# Patient Record
Sex: FEMALE | Race: BLACK OR AFRICAN AMERICAN | Employment: UNEMPLOYED | URBAN - METROPOLITAN AREA
[De-identification: names, ages, dates, MRNs, and addresses within clinical notes are randomized per-mention and may not be internally consistent; named-entity substitution may affect disease eponyms.]

---
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
## 2025-01-06 RX ORDER — ERGOCALCIFEROL 1.25 MG/1
50000 CAPSULE, LIQUID FILLED ORAL WEEKLY
Qty: 12 CAPSULE | Refills: 1 | Status: SHIPPED | OUTPATIENT
Start: 2025-01-06

## 2025-02-17 ENCOUNTER — OFFICE VISIT (OUTPATIENT)
Dept: FAMILY MEDICINE CLINIC | Age: 20
End: 2025-02-17

## 2025-02-17 VITALS
BODY MASS INDEX: 26.28 KG/M2 | WEIGHT: 142.8 LBS | OXYGEN SATURATION: 99 % | SYSTOLIC BLOOD PRESSURE: 110 MMHG | DIASTOLIC BLOOD PRESSURE: 78 MMHG | TEMPERATURE: 97 F | HEIGHT: 62 IN | HEART RATE: 67 BPM | RESPIRATION RATE: 16 BRPM

## 2025-02-17 DIAGNOSIS — J40 SINOBRONCHITIS: ICD-10-CM

## 2025-02-17 DIAGNOSIS — J32.9 SINOBRONCHITIS: ICD-10-CM

## 2025-02-17 DIAGNOSIS — Z00.00 ENCOUNTER FOR WELL ADULT EXAM WITHOUT ABNORMAL FINDINGS: Primary | ICD-10-CM

## 2025-02-17 DIAGNOSIS — G43.E09 CHRONIC MIGRAINE WITH AURA WITHOUT STATUS MIGRAINOSUS, NOT INTRACTABLE: ICD-10-CM

## 2025-02-17 RX ORDER — SUMATRIPTAN SUCCINATE 25 MG/1
TABLET ORAL
Qty: 9 TABLET | Refills: 5 | Status: SHIPPED | OUTPATIENT
Start: 2025-02-17

## 2025-02-17 RX ORDER — LORATADINE 10 MG/1
10 TABLET ORAL DAILY
Qty: 30 TABLET | Refills: 5 | Status: SHIPPED | OUTPATIENT
Start: 2025-02-17

## 2025-02-17 RX ORDER — NAPROXEN 500 MG/1
500 TABLET ORAL 2 TIMES DAILY WITH MEALS
Qty: 60 TABLET | Refills: 5 | Status: SHIPPED | OUTPATIENT
Start: 2025-02-17

## 2025-02-17 SDOH — ECONOMIC STABILITY: FOOD INSECURITY: WITHIN THE PAST 12 MONTHS, THE FOOD YOU BOUGHT JUST DIDN'T LAST AND YOU DIDN'T HAVE MONEY TO GET MORE.: NEVER TRUE

## 2025-02-17 SDOH — ECONOMIC STABILITY: FOOD INSECURITY: WITHIN THE PAST 12 MONTHS, YOU WORRIED THAT YOUR FOOD WOULD RUN OUT BEFORE YOU GOT MONEY TO BUY MORE.: NEVER TRUE

## 2025-02-17 ASSESSMENT — PATIENT HEALTH QUESTIONNAIRE - PHQ9
1. LITTLE INTEREST OR PLEASURE IN DOING THINGS: NOT AT ALL
2. FEELING DOWN, DEPRESSED OR HOPELESS: NOT AT ALL
SUM OF ALL RESPONSES TO PHQ QUESTIONS 1-9: 0
SUM OF ALL RESPONSES TO PHQ9 QUESTIONS 1 & 2: 0
SUM OF ALL RESPONSES TO PHQ QUESTIONS 1-9: 0

## 2025-02-17 NOTE — PROGRESS NOTES
]  Well Adult Note  Name: Estrella Cadet Today’s Date: 2025   MRN: [id number] Sex: Female   Age: 20 y.o. Ethnicity: Unavailable / Unknown   : 2005 Race: Black /       Estrella Cadet is here for a well adult exam.          Assessment & Plan  1. Sinusitis.  She has been experiencing symptoms for 3 weeks, including runny nose, stuffiness, and congestion. An antibiotic will be prescribed to address the sinus infection. She is advised to continue using Claritin and naproxen for symptom relief.    2. Migraine headaches.  She reports frequent migraines occurring at least four times a week, sometimes accompanied by aura. The migraines are not related to her menstrual cycle. Imitrex will be prescribed to be taken at the onset of a migraine. If needed, a second dose can be taken after 2 hours, but no more than two doses in a 24-hour period. Information about migraines and potential preventive measures, including melatonin, CoQ10, riboflavin, and over-the-counter magnesium, will be provided. She is advised to take these supplements consistently for about a month to evaluate their effectiveness. If Imitrex is effective in aborting the migraines but they persist at a frequency of four times per week, a discussion regarding preventive therapy will be initiated.    Encounter for well adult exam without abnormal findings  Chronic migraine with aura without status migrainosus, not intractable  -     SUMAtriptan (IMITREX) 25 MG tablet; 1 tab po at onset HA. May repeat in 2 hours if HA persists. Max 2 pills/24 hours, Disp-9 tablet, R-5Normal  Results         Return in 1 year (on 2026) for CPE (Physical Exam).     Subjective   History of Present Illness  The patient presents for a wellness exam.    She has been experiencing symptoms of illness, including rhinorrhea, nasal congestion, and sinus pressure, persisting for the past 3 weeks. She reports no changes in her medical or surgical history. She

## 2025-02-17 NOTE — PATIENT INSTRUCTIONS
Magnesium Oxide (400-1000 mg daily):  has a relaxant effect on smooth muscles and blood vessels. We often give intravenous magnesium to patients who come into the emergency department for migraine because it helps to break the migraine. Three trials found 40-90% average headache reduction when used as a preventative. Magnesium also demonstrated the benefit in menstrually related migraine. Magnesium is part of the messenger system in the serotonin cascade and it is a good muscle relaxant. It is also useful for constipation which can be a side effect of other medications used to treat migraine. Good sources include nuts, whole grains, and tomatoes.     Coenzyme Q10 (200 mg daily or 150 mg twice daily): twice a day have been shown to be effective. (: This is present in almost all cells in the body and is critical component for the conversion of energy. Recent studies have shown that a nutritional supplement of CoQ10 can reduce the frequency of migraine attacks by improving the energy production of cells as with riboflavin.     Riboflavin (Vitamin B2): 200 mg twice a day (or 400 mg daily). This vitamin assists nerve cells in the production of ATP, a principal energy storing molecule. It is necessary for many chemical reactions in the body. There have been at least 3 clinical trials of riboflavin using 400 mg per day all of which suggested that migraine frequency can be decreased. All 3 trials showed significant improvement in over half of migraine sufferers. The supplement is found in bread, cereal, milk, meat, and poultry. Most Americans get more riboflavin than the recommended daily allowance, however riboflavin deficiency is not necessary for the supplements to help prevent headache.     Melatonin: Increasing evidence shows correlation between melatonin secretion and headache conditions. Melatonin supplementation has shown decreased headache intensity and duration. It is widely used as a sleep aid. Sleep is nature's

## 2025-02-19 RX ORDER — AMOXICILLIN 500 MG/1
500 CAPSULE ORAL 2 TIMES DAILY
Qty: 20 CAPSULE | Refills: 0 | Status: SHIPPED | OUTPATIENT
Start: 2025-02-19 | End: 2025-03-01

## 2025-05-05 ENCOUNTER — APPOINTMENT (OUTPATIENT)
Dept: GENERAL RADIOLOGY | Age: 20
End: 2025-05-05
Payer: MEDICAID

## 2025-05-05 ENCOUNTER — APPOINTMENT (OUTPATIENT)
Dept: ULTRASOUND IMAGING | Age: 20
End: 2025-05-05
Payer: MEDICAID

## 2025-05-05 ENCOUNTER — HOSPITAL ENCOUNTER (EMERGENCY)
Age: 20
Discharge: HOME | End: 2025-05-05
Payer: MEDICAID

## 2025-05-05 VITALS
SYSTOLIC BLOOD PRESSURE: 123 MMHG | WEIGHT: 135 LBS | HEIGHT: 62 IN | RESPIRATION RATE: 14 BRPM | OXYGEN SATURATION: 100 % | DIASTOLIC BLOOD PRESSURE: 86 MMHG | BODY MASS INDEX: 24.84 KG/M2 | TEMPERATURE: 98.1 F | HEART RATE: 92 BPM

## 2025-05-05 DIAGNOSIS — R10.30 LOWER ABDOMINAL PAIN: Primary | ICD-10-CM

## 2025-05-05 LAB
ALBUMIN SERPL-MCNC: 4.6 G/DL (ref 3.5–5.2)
ALP SERPL-CCNC: 95 U/L (ref 35–104)
ALT SERPL-CCNC: 9 U/L (ref 0–32)
ANION GAP SERPL CALCULATED.3IONS-SCNC: 13 MMOL/L (ref 7–16)
AST SERPL-CCNC: 23 U/L (ref 0–31)
BACTERIA URNS QL MICRO: ABNORMAL
BASOPHILS # BLD: 0.03 K/UL (ref 0–0.2)
BASOPHILS NFR BLD: 1 % (ref 0–2)
BILIRUB SERPL-MCNC: 0.6 MG/DL (ref 0–1.2)
BILIRUB UR QL STRIP: NEGATIVE
BUN SERPL-MCNC: 14 MG/DL (ref 6–20)
CALCIUM SERPL-MCNC: 9.8 MG/DL (ref 8.6–10.2)
CHLORIDE SERPL-SCNC: 104 MMOL/L (ref 98–107)
CLARITY UR: CLEAR
CO2 SERPL-SCNC: 22 MMOL/L (ref 22–29)
COLOR UR: YELLOW
CREAT SERPL-MCNC: 0.9 MG/DL (ref 0.5–1)
EOSINOPHIL # BLD: 0.06 K/UL (ref 0.05–0.5)
EOSINOPHILS RELATIVE PERCENT: 1 % (ref 0–6)
EPI CELLS #/AREA URNS HPF: ABNORMAL /HPF
ERYTHROCYTE [DISTWIDTH] IN BLOOD BY AUTOMATED COUNT: 11.8 % (ref 11.5–15)
GFR, ESTIMATED: >90 ML/MIN/1.73M2
GLUCOSE SERPL-MCNC: 84 MG/DL (ref 74–99)
GLUCOSE UR STRIP-MCNC: NEGATIVE MG/DL
HCG, URINE, POC: NEGATIVE
HCT VFR BLD AUTO: 37.1 % (ref 34–48)
HGB BLD-MCNC: 11.9 G/DL (ref 11.5–15.5)
HGB UR QL STRIP.AUTO: NEGATIVE
IMM GRANULOCYTES # BLD AUTO: <0.03 K/UL (ref 0–0.58)
IMM GRANULOCYTES NFR BLD: 0 % (ref 0–5)
KETONES UR STRIP-MCNC: ABNORMAL MG/DL
LEUKOCYTE ESTERASE UR QL STRIP: NEGATIVE
LYMPHOCYTES NFR BLD: 2.02 K/UL (ref 1.5–4)
LYMPHOCYTES RELATIVE PERCENT: 34 % (ref 20–42)
Lab: NORMAL
MCH RBC QN AUTO: 30.9 PG (ref 26–35)
MCHC RBC AUTO-ENTMCNC: 32.1 G/DL (ref 32–34.5)
MCV RBC AUTO: 96.4 FL (ref 80–99.9)
MONOCYTES NFR BLD: 0.5 K/UL (ref 0.1–0.95)
MONOCYTES NFR BLD: 9 % (ref 2–12)
MUCOUS THREADS URNS QL MICRO: PRESENT
NEGATIVE QC PASS/FAIL: NORMAL
NEUTROPHILS NFR BLD: 56 % (ref 43–80)
NEUTS SEG NFR BLD: 3.27 K/UL (ref 1.8–7.3)
NITRITE UR QL STRIP: NEGATIVE
PH UR STRIP: 6 [PH] (ref 5–8)
PLATELET # BLD AUTO: 226 K/UL (ref 130–450)
PMV BLD AUTO: 10.4 FL (ref 7–12)
POSITIVE QC PASS/FAIL: NORMAL
POTASSIUM SERPL-SCNC: 3.7 MMOL/L (ref 3.5–5)
PROT SERPL-MCNC: 7.7 G/DL (ref 6.4–8.3)
PROT UR STRIP-MCNC: 30 MG/DL
RBC # BLD AUTO: 3.85 M/UL (ref 3.5–5.5)
RBC #/AREA URNS HPF: ABNORMAL /HPF
SODIUM SERPL-SCNC: 139 MMOL/L (ref 132–146)
SP GR UR STRIP: 1.02 (ref 1–1.03)
UROBILINOGEN UR STRIP-ACNC: 1 EU/DL (ref 0–1)
WBC #/AREA URNS HPF: ABNORMAL /HPF
WBC OTHER # BLD: 5.9 K/UL (ref 4.5–11.5)

## 2025-05-05 PROCEDURE — 74022 RADEX COMPL AQT ABD SERIES: CPT

## 2025-05-05 PROCEDURE — 99284 EMERGENCY DEPT VISIT MOD MDM: CPT

## 2025-05-05 PROCEDURE — 2580000003 HC RX 258: Performed by: PHYSICIAN ASSISTANT

## 2025-05-05 PROCEDURE — 81001 URINALYSIS AUTO W/SCOPE: CPT

## 2025-05-05 PROCEDURE — 85025 COMPLETE CBC W/AUTO DIFF WBC: CPT

## 2025-05-05 PROCEDURE — 76856 US EXAM PELVIC COMPLETE: CPT

## 2025-05-05 PROCEDURE — 80053 COMPREHEN METABOLIC PANEL: CPT

## 2025-05-05 RX ORDER — 0.9 % SODIUM CHLORIDE 0.9 %
1000 INTRAVENOUS SOLUTION INTRAVENOUS ONCE
Status: COMPLETED | OUTPATIENT
Start: 2025-05-05 | End: 2025-05-05

## 2025-05-05 RX ADMIN — SODIUM CHLORIDE 1000 ML: 0.9 INJECTION, SOLUTION INTRAVENOUS at 12:33

## 2025-05-05 ASSESSMENT — PAIN SCALES - GENERAL: PAINLEVEL_OUTOF10: 4

## 2025-05-05 NOTE — ED PROVIDER NOTES
Independent VIRAL Visit.               [facility] EMERGENCY DEPARTMENT  ED  Encounter Note  Admit Date/RoomTime: 2025 11:47 AM  ED Room: DISPO/D02  NAME: Estrella Cadet  : 2005  MRN: [id number]  PCP: Zeina Brooks DO    CHIEF COMPLAINT     Abdominal Pain (Right lower quadrant abdomin pain radiates into groin x 1 month)    HISTORY OF PRESENT ILLNESS        Estrella Cadet is a 20 y.o. female who presents to the ED by private vehicle for right lower pelvic pain, beginning 4 week(s) ago. The complaint has been intermittent and are moderate in severity.  The patient is having intermittent right lower quadrant abdominal pain.  It started about a month ago when she started a new job.  There was no fall or injury.  The pain is intermittent.  It is not constant.  Denies nausea, vomiting or diarrhea.  No reported fever or chills.  Mom thinks it may have something to do with her lifting.  The patient reports normal menstrual cycles.  She does not believe she is pregnant.    REVIEW OF SYSTEMS     Pertinent positives and negatives are stated within HPI, all other systems reviewed and are negative.    Past Medical History:  has a past medical history of Chronic migraine with aura and Depression.  Surgical History:  has a past surgical history that includes US BREAST BIOPSY W LOC DEVICE 1ST LESION RIGHT (Right, 2021).  Social History:  reports that she has never smoked. She has never used smokeless tobacco. She reports that she does not drink alcohol and does not use drugs.  Family History: family history includes Anxiety Disorder in her mother; Atrial Fibrillation in her mother; Cerebral Aneurysm in her father; Depression in her mother; Diabetes in her maternal grandfather; Heart Murmur in her brother; High Blood Pressure in her father, maternal grandfather, maternal grandmother, and paternal grandmother; High Cholesterol in her maternal grandfather and maternal grandmother;

## 2025-05-08 ENCOUNTER — OFFICE VISIT (OUTPATIENT)
Dept: FAMILY MEDICINE CLINIC | Age: 20
End: 2025-05-08
Payer: MEDICAID

## 2025-05-08 VITALS
HEART RATE: 65 BPM | DIASTOLIC BLOOD PRESSURE: 72 MMHG | OXYGEN SATURATION: 100 % | BODY MASS INDEX: 26.04 KG/M2 | WEIGHT: 141.5 LBS | HEIGHT: 62 IN | SYSTOLIC BLOOD PRESSURE: 126 MMHG | TEMPERATURE: 97.6 F | RESPIRATION RATE: 16 BRPM

## 2025-05-08 DIAGNOSIS — E55.9 VITAMIN D DEFICIENCY: ICD-10-CM

## 2025-05-08 DIAGNOSIS — N92.1 MENORRHAGIA WITH IRREGULAR CYCLE: Primary | ICD-10-CM

## 2025-05-08 DIAGNOSIS — R10.2 PELVIC PAIN: ICD-10-CM

## 2025-05-08 PROCEDURE — 1036F TOBACCO NON-USER: CPT | Performed by: FAMILY MEDICINE

## 2025-05-08 PROCEDURE — G8427 DOCREV CUR MEDS BY ELIG CLIN: HCPCS | Performed by: FAMILY MEDICINE

## 2025-05-08 PROCEDURE — 36415 COLL VENOUS BLD VENIPUNCTURE: CPT | Performed by: FAMILY MEDICINE

## 2025-05-08 PROCEDURE — 99214 OFFICE O/P EST MOD 30 MIN: CPT | Performed by: FAMILY MEDICINE

## 2025-05-08 PROCEDURE — G8419 CALC BMI OUT NRM PARAM NOF/U: HCPCS | Performed by: FAMILY MEDICINE

## 2025-05-08 RX ORDER — ERGOCALCIFEROL 1.25 MG/1
50000 CAPSULE, LIQUID FILLED ORAL WEEKLY
Qty: 12 CAPSULE | Refills: 1 | Status: SHIPPED | OUTPATIENT
Start: 2025-05-08

## 2025-05-09 LAB
FERRITIN: 52 NG/ML
IRON % SATURATION: 26 % (ref 15–50)
IRON: 84 UG/DL (ref 37–145)
PROLACTIN: 15.9 NG/ML
TOTAL IRON BINDING CAPACITY: 322 UG/DL (ref 250–450)
TSH SERPL DL<=0.05 MIU/L-ACNC: 1.54 UIU/ML (ref 0.27–4.2)
VITAMIN D 25-HYDROXY: 27.3 NG/ML (ref 30–100)

## 2025-05-14 ENCOUNTER — RESULTS FOLLOW-UP (OUTPATIENT)
Dept: FAMILY MEDICINE CLINIC | Age: 20
End: 2025-05-14

## 2025-06-02 ENCOUNTER — HOSPITAL ENCOUNTER (OUTPATIENT)
Dept: ULTRASOUND IMAGING | Age: 20
Discharge: HOME | End: 2025-06-04
Attending: FAMILY MEDICINE
Payer: MEDICAID

## 2025-06-02 ENCOUNTER — RESULTS FOLLOW-UP (OUTPATIENT)
Dept: FAMILY MEDICINE CLINIC | Age: 20
End: 2025-06-02

## 2025-06-02 DIAGNOSIS — N92.1 MENORRHAGIA WITH IRREGULAR CYCLE: ICD-10-CM

## 2025-06-02 DIAGNOSIS — R10.2 PELVIC PAIN: ICD-10-CM

## 2025-06-02 PROCEDURE — 76830 TRANSVAGINAL US NON-OB: CPT

## 2025-06-26 ENCOUNTER — OFFICE VISIT (OUTPATIENT)
Age: 20
End: 2025-06-26
Payer: MEDICAID

## 2025-06-26 ENCOUNTER — HOSPITAL ENCOUNTER (OUTPATIENT)
Age: 20
Discharge: HOME | End: 2025-06-26
Payer: MEDICAID

## 2025-06-26 VITALS
SYSTOLIC BLOOD PRESSURE: 116 MMHG | HEART RATE: 64 BPM | OXYGEN SATURATION: 98 % | TEMPERATURE: 97.9 F | DIASTOLIC BLOOD PRESSURE: 80 MMHG

## 2025-06-26 DIAGNOSIS — N92.6 IRREGULAR MENSES: ICD-10-CM

## 2025-06-26 DIAGNOSIS — N92.6 IRREGULAR MENSES: Primary | ICD-10-CM

## 2025-06-26 LAB
ESTRADIOL LEVEL: 68.6 PG/ML
FSH SERPL-ACNC: 7.8 MIU/ML
GLUCOSE SERPL-MCNC: 80 MG/DL (ref 74–99)
LH SERPL-ACNC: 14.4 MIU/ML
TESTOST SERPL-MCNC: 13 NG/DL (ref 8–48)

## 2025-06-26 PROCEDURE — G8419 CALC BMI OUT NRM PARAM NOF/U: HCPCS | Performed by: OBSTETRICS & GYNECOLOGY

## 2025-06-26 PROCEDURE — G8427 DOCREV CUR MEDS BY ELIG CLIN: HCPCS | Performed by: OBSTETRICS & GYNECOLOGY

## 2025-06-26 PROCEDURE — 83002 ASSAY OF GONADOTROPIN (LH): CPT

## 2025-06-26 PROCEDURE — 1036F TOBACCO NON-USER: CPT | Performed by: OBSTETRICS & GYNECOLOGY

## 2025-06-26 PROCEDURE — 84403 ASSAY OF TOTAL TESTOSTERONE: CPT

## 2025-06-26 PROCEDURE — 82670 ASSAY OF TOTAL ESTRADIOL: CPT

## 2025-06-26 PROCEDURE — 83525 ASSAY OF INSULIN: CPT

## 2025-06-26 PROCEDURE — 99203 OFFICE O/P NEW LOW 30 MIN: CPT | Performed by: OBSTETRICS & GYNECOLOGY

## 2025-06-26 PROCEDURE — 82627 DEHYDROEPIANDROSTERONE: CPT

## 2025-06-26 PROCEDURE — 83498 ASY HYDROXYPROGESTERONE 17-D: CPT

## 2025-06-26 PROCEDURE — 99202 OFFICE O/P NEW SF 15 MIN: CPT | Performed by: OBSTETRICS & GYNECOLOGY

## 2025-06-26 PROCEDURE — 82947 ASSAY GLUCOSE BLOOD QUANT: CPT

## 2025-06-26 PROCEDURE — 36415 COLL VENOUS BLD VENIPUNCTURE: CPT

## 2025-06-26 PROCEDURE — 83001 ASSAY OF GONADOTROPIN (FSH): CPT

## 2025-06-26 NOTE — PROGRESS NOTES
HISTORY OF PRESENT ILLNESS:    20 y.o. female     New patient here to establish care  Patient has complaints of fatigue and irregular cycles. Working 2 jobs and in college.   Periods are q 1-2 months lasting 5-7 days. Periods are heavy. Changes pad q 3 hours. +Clots and painful.   No abnormal hair growth. Some acne.      Not sexually active. Never has been.     Just had US. Uterus 7 cm, ES 1.1 cm, normal ovaries.    Prolactin, TSH, cbc normal. Hcg negative    Had the hpv vaccine.    Unsure if she wants birth control. Mom has hx of blood clot after surgery.     Past Medical History:   Past Medical History:   Diagnosis Date    Chronic migraine with aura     Depression                                              OB History    Para Term  AB Living   0 0 0 0 0 0   SAB IAB Ectopic Molar Multiple Live Births   0 0 0 0 0 0         Past Surgical History:   Past Surgical History:   Procedure Laterality Date    US BREAST BIOPSY W LOC DEVICE 1ST LESION RIGHT Right 2021    US BREAST NEEDLE BIOPSY RIGHT 2021 [facility]        Allergies: Environmental/seasonal     Medications:   Current Outpatient Medications   Medication Sig Dispense Refill    vitamin D (ERGOCALCIFEROL) 1.25 MG (73066 UT) CAPS capsule Take 1 capsule by mouth once a week 12 capsule 1    naproxen (NAPROSYN) 500 MG tablet Take 1 tablet by mouth 2 times daily (with meals) 60 tablet 5    loratadine (CLARITIN) 10 MG tablet Take 1 tablet by mouth daily 30 tablet 5    SUMAtriptan (IMITREX) 25 MG tablet 1 tab po at onset HA. May repeat in 2 hours if HA persists. Max 2 pills/24 hours 9 tablet 5    polyethylene glycol (MIRALAX) 17 g PACK packet Take 17 g by mouth daily 7 each 3    ibuprofen (ADVIL;MOTRIN) 100 MG/5ML suspension Take by mouth every 4 hours as needed       No current facility-administered medications for this visit.         Social History:   Social History     Tobacco Use    Smoking status: Never    Smokeless tobacco: Never

## 2025-06-26 NOTE — PROGRESS NOTES
New patient here to establish care  Patient has complaints of fatigue and irregular cycles. Working 2 jobs and in college.   Discharge instructions have been discussed with the patient. Patient advised to call our office with any questions or concerns.   Voiced understanding.

## 2025-06-27 LAB
DHEA-S SERPL-MCNC: 96 UG/DL (ref 148–407)
INSULIN REFERENCE RANGE:: NORMAL
INSULIN: 5.1 MU/L

## 2025-07-01 LAB — 17OHP SERPL-MCNC: 31.71 NG/DL
